# Patient Record
Sex: MALE | Race: WHITE | NOT HISPANIC OR LATINO | Employment: OTHER | URBAN - METROPOLITAN AREA
[De-identification: names, ages, dates, MRNs, and addresses within clinical notes are randomized per-mention and may not be internally consistent; named-entity substitution may affect disease eponyms.]

---

## 2019-10-17 ENCOUNTER — HOSPITAL ENCOUNTER (OUTPATIENT)
Dept: RADIOLOGY | Facility: HOSPITAL | Age: 69
Discharge: HOME | End: 2019-10-17
Attending: ORTHOPAEDIC SURGERY
Payer: MEDICARE

## 2019-10-17 DIAGNOSIS — M25.561 BILATERAL KNEE PAIN: ICD-10-CM

## 2019-10-17 DIAGNOSIS — M25.562 BILATERAL KNEE PAIN: ICD-10-CM

## 2019-10-17 PROCEDURE — 73564 X-RAY EXAM KNEE 4 OR MORE: CPT | Mod: 50,TC

## 2020-08-26 ENCOUNTER — HOSPITAL ENCOUNTER (EMERGENCY)
Facility: HOSPITAL | Age: 70
Discharge: HOME | End: 2020-08-27
Attending: SURGERY
Payer: MEDICARE

## 2020-08-26 DIAGNOSIS — R53.83 FATIGUE: ICD-10-CM

## 2020-08-26 DIAGNOSIS — F10.10 ALCOHOL ABUSE: Primary | ICD-10-CM

## 2020-08-26 LAB
ALBUMIN SERPL BCP-MCNC: 3.5 G/DL (ref 3.5–5.2)
ALP SERPL-CCNC: 70 U/L (ref 55–135)
ALT SERPL W/O P-5'-P-CCNC: 26 U/L (ref 10–44)
AMPHET+METHAMPHET UR QL: NEGATIVE
ANION GAP SERPL CALC-SCNC: 15 MMOL/L (ref 8–16)
APAP SERPL-MCNC: <3 UG/ML (ref 10–20)
APTT BLDCRRT: 29.1 SEC (ref 21–32)
AST SERPL-CCNC: 24 U/L (ref 10–40)
BARBITURATES UR QL SCN>200 NG/ML: NEGATIVE
BASOPHILS # BLD AUTO: 0.04 K/UL (ref 0–0.2)
BASOPHILS NFR BLD: 0.5 % (ref 0–1.9)
BENZODIAZ UR QL SCN>200 NG/ML: NEGATIVE
BILIRUB SERPL-MCNC: 0.4 MG/DL (ref 0.1–1)
BILIRUB UR QL STRIP: NEGATIVE
BNP SERPL-MCNC: 48 PG/ML (ref 0–99)
BUN SERPL-MCNC: 12 MG/DL (ref 8–23)
BZE UR QL SCN: NEGATIVE
CALCIUM SERPL-MCNC: 9.1 MG/DL (ref 8.7–10.5)
CANNABINOIDS UR QL SCN: NEGATIVE
CHLORIDE SERPL-SCNC: 87 MMOL/L (ref 95–110)
CK MB SERPL-MCNC: 1.1 NG/ML (ref 0.1–6.5)
CK MB SERPL-RTO: 2.6 % (ref 0–5)
CK SERPL-CCNC: 42 U/L (ref 20–200)
CK SERPL-CCNC: 42 U/L (ref 20–200)
CLARITY UR: CLEAR
CO2 SERPL-SCNC: 28 MMOL/L (ref 23–29)
COLOR UR: YELLOW
CREAT SERPL-MCNC: 0.7 MG/DL (ref 0.5–1.4)
CREAT UR-MCNC: 52.1 MG/DL (ref 23–375)
D DIMER PPP IA.FEU-MCNC: 0.29 MG/L FEU
DIFFERENTIAL METHOD: ABNORMAL
EOSINOPHIL # BLD AUTO: 0.1 K/UL (ref 0–0.5)
EOSINOPHIL NFR BLD: 1.3 % (ref 0–8)
ERYTHROCYTE [DISTWIDTH] IN BLOOD BY AUTOMATED COUNT: 12.4 % (ref 11.5–14.5)
EST. GFR  (AFRICAN AMERICAN): >60 ML/MIN/1.73 M^2
EST. GFR  (NON AFRICAN AMERICAN): >60 ML/MIN/1.73 M^2
ETHANOL SERPL-MCNC: 249 MG/DL
GLUCOSE SERPL-MCNC: 125 MG/DL (ref 70–110)
GLUCOSE UR QL STRIP: NEGATIVE
HCT VFR BLD AUTO: 39.8 % (ref 40–54)
HGB BLD-MCNC: 14.1 G/DL (ref 14–18)
HGB UR QL STRIP: NEGATIVE
IMM GRANULOCYTES # BLD AUTO: 0.04 K/UL (ref 0–0.04)
IMM GRANULOCYTES NFR BLD AUTO: 0.5 % (ref 0–0.5)
INR PPP: 1 (ref 0.8–1.2)
KETONES UR QL STRIP: ABNORMAL
LEUKOCYTE ESTERASE UR QL STRIP: NEGATIVE
LYMPHOCYTES # BLD AUTO: 2.4 K/UL (ref 1–4.8)
LYMPHOCYTES NFR BLD: 29.3 % (ref 18–48)
MAGNESIUM SERPL-MCNC: 1.7 MG/DL (ref 1.6–2.6)
MCH RBC QN AUTO: 33.2 PG (ref 27–31)
MCHC RBC AUTO-ENTMCNC: 35.4 G/DL (ref 32–36)
MCV RBC AUTO: 94 FL (ref 82–98)
METHADONE UR QL SCN>300 NG/ML: NEGATIVE
MONOCYTES # BLD AUTO: 0.9 K/UL (ref 0.3–1)
MONOCYTES NFR BLD: 10.9 % (ref 4–15)
NEUTROPHILS # BLD AUTO: 4.8 K/UL (ref 1.8–7.7)
NEUTROPHILS NFR BLD: 57.5 % (ref 38–73)
NITRITE UR QL STRIP: NEGATIVE
NRBC BLD-RTO: 0 /100 WBC
OPIATES UR QL SCN: NEGATIVE
PCP UR QL SCN>25 NG/ML: NEGATIVE
PH UR STRIP: 6 [PH] (ref 5–8)
PHOSPHATE SERPL-MCNC: 3.5 MG/DL (ref 2.7–4.5)
PLATELET # BLD AUTO: 250 K/UL (ref 150–350)
PMV BLD AUTO: 8.3 FL (ref 9.2–12.9)
POTASSIUM SERPL-SCNC: 3.2 MMOL/L (ref 3.5–5.1)
PROT SERPL-MCNC: 7 G/DL (ref 6–8.4)
PROT UR QL STRIP: NEGATIVE
PROTHROMBIN TIME: 10.8 SEC (ref 9–12.5)
RBC # BLD AUTO: 4.25 M/UL (ref 4.6–6.2)
SALICYLATES SERPL-MCNC: <5 MG/DL (ref 15–30)
SODIUM SERPL-SCNC: 130 MMOL/L (ref 136–145)
SP GR UR STRIP: 1.01 (ref 1–1.03)
TOXICOLOGY INFORMATION: NORMAL
TROPONIN I SERPL DL<=0.01 NG/ML-MCNC: 0.01 NG/ML (ref 0–0.03)
TSH SERPL DL<=0.005 MIU/L-ACNC: 1.62 UIU/ML (ref 0.4–4)
URN SPEC COLLECT METH UR: ABNORMAL
UROBILINOGEN UR STRIP-ACNC: NEGATIVE EU/DL
WBC # BLD AUTO: 8.33 K/UL (ref 3.9–12.7)

## 2020-08-26 PROCEDURE — 82550 ASSAY OF CK (CPK): CPT

## 2020-08-26 PROCEDURE — 93010 EKG 12-LEAD: ICD-10-PCS | Mod: ,,, | Performed by: INTERNAL MEDICINE

## 2020-08-26 PROCEDURE — 63600175 PHARM REV CODE 636 W HCPCS: Performed by: SURGERY

## 2020-08-26 PROCEDURE — 83880 ASSAY OF NATRIURETIC PEPTIDE: CPT

## 2020-08-26 PROCEDURE — 81003 URINALYSIS AUTO W/O SCOPE: CPT | Mod: 59

## 2020-08-26 PROCEDURE — 85610 PROTHROMBIN TIME: CPT

## 2020-08-26 PROCEDURE — 85379 FIBRIN DEGRADATION QUANT: CPT

## 2020-08-26 PROCEDURE — 82553 CREATINE MB FRACTION: CPT

## 2020-08-26 PROCEDURE — 80053 COMPREHEN METABOLIC PANEL: CPT

## 2020-08-26 PROCEDURE — 85025 COMPLETE CBC W/AUTO DIFF WBC: CPT

## 2020-08-26 PROCEDURE — 80320 DRUG SCREEN QUANTALCOHOLS: CPT

## 2020-08-26 PROCEDURE — 83735 ASSAY OF MAGNESIUM: CPT

## 2020-08-26 PROCEDURE — 96365 THER/PROPH/DIAG IV INF INIT: CPT

## 2020-08-26 PROCEDURE — 84443 ASSAY THYROID STIM HORMONE: CPT

## 2020-08-26 PROCEDURE — 96368 THER/DIAG CONCURRENT INF: CPT

## 2020-08-26 PROCEDURE — 82306 VITAMIN D 25 HYDROXY: CPT

## 2020-08-26 PROCEDURE — 80307 DRUG TEST PRSMV CHEM ANLYZR: CPT

## 2020-08-26 PROCEDURE — 85730 THROMBOPLASTIN TIME PARTIAL: CPT

## 2020-08-26 PROCEDURE — 80329 ANALGESICS NON-OPIOID 1 OR 2: CPT

## 2020-08-26 PROCEDURE — 93010 ELECTROCARDIOGRAM REPORT: CPT | Mod: ,,, | Performed by: INTERNAL MEDICINE

## 2020-08-26 PROCEDURE — 82746 ASSAY OF FOLIC ACID SERUM: CPT

## 2020-08-26 PROCEDURE — 93005 ELECTROCARDIOGRAM TRACING: CPT

## 2020-08-26 PROCEDURE — 96361 HYDRATE IV INFUSION ADD-ON: CPT

## 2020-08-26 PROCEDURE — 36415 COLL VENOUS BLD VENIPUNCTURE: CPT

## 2020-08-26 PROCEDURE — 25000003 PHARM REV CODE 250: Performed by: SURGERY

## 2020-08-26 PROCEDURE — 82607 VITAMIN B-12: CPT

## 2020-08-26 PROCEDURE — 84484 ASSAY OF TROPONIN QUANT: CPT

## 2020-08-26 PROCEDURE — 99285 EMERGENCY DEPT VISIT HI MDM: CPT | Mod: 25

## 2020-08-26 PROCEDURE — 96366 THER/PROPH/DIAG IV INF ADDON: CPT

## 2020-08-26 PROCEDURE — 84100 ASSAY OF PHOSPHORUS: CPT

## 2020-08-26 RX ORDER — VALSARTAN AND HYDROCHLOROTHIAZIDE 160; 25 MG/1; MG/1
1 TABLET ORAL DAILY
COMMUNITY

## 2020-08-26 RX ORDER — SODIUM CHLORIDE 9 MG/ML
1000 INJECTION, SOLUTION INTRAVENOUS
Status: COMPLETED | OUTPATIENT
Start: 2020-08-26 | End: 2020-08-27

## 2020-08-26 RX ORDER — AMLODIPINE BESYLATE 10 MG/1
10 TABLET ORAL DAILY
COMMUNITY

## 2020-08-26 RX ADMIN — ASCORBIC ACID, VITAMIN A PALMITATE, CHOLECALCIFEROL, THIAMINE HYDROCHLORIDE, RIBOFLAVIN-5 PHOSPHATE SODIUM, PYRIDOXINE HYDROCHLORIDE, NIACINAMIDE, DEXPANTHENOL, ALPHA-TOCOPHEROL ACETATE, VITAMIN K1, FOLIC ACID, BIOTIN, CYANOCOBALAMIN: 200; 3300; 200; 6; 3.6; 6; 40; 15; 10; 150; 600; 60; 5 INJECTION, SOLUTION INTRAVENOUS at 07:08

## 2020-08-26 RX ADMIN — THIAMINE HYDROCHLORIDE 100 MG: 100 INJECTION, SOLUTION INTRAMUSCULAR; INTRAVENOUS at 07:08

## 2020-08-26 RX ADMIN — FOLIC ACID 1 MG: 5 INJECTION, SOLUTION INTRAMUSCULAR; INTRAVENOUS; SUBCUTANEOUS at 07:08

## 2020-08-26 RX ADMIN — SODIUM CHLORIDE 1000 ML: 0.9 INJECTION, SOLUTION INTRAVENOUS at 09:08

## 2020-08-26 NOTE — ED TRIAGE NOTES
"69 y.o. male presents to ER ED 01/ED 01A   Chief Complaint   Patient presents with    Fall     brought via [facility] states pt fell off chair; reports pt has been drinking etoh. 18 g L H per [facility].    Pt states he accidentally fell of his chair; pt reports he just "could not get up". Denies LOC. Denies hitting head. No acute distress noted.    "

## 2020-08-27 ENCOUNTER — TELEPHONE (OUTPATIENT)
Dept: EMERGENCY MEDICINE | Facility: HOSPITAL | Age: 70
End: 2020-08-27

## 2020-08-27 VITALS
OXYGEN SATURATION: 96 % | DIASTOLIC BLOOD PRESSURE: 70 MMHG | RESPIRATION RATE: 20 BRPM | TEMPERATURE: 99 F | HEART RATE: 98 BPM | SYSTOLIC BLOOD PRESSURE: 164 MMHG

## 2020-08-27 LAB
25(OH)D3+25(OH)D2 SERPL-MCNC: 41 NG/ML (ref 30–96)
CK MB SERPL-MCNC: 1.9 NG/ML (ref 0.1–6.5)
CK MB SERPL-RTO: 3.3 % (ref 0–5)
CK SERPL-CCNC: 57 U/L (ref 20–200)
CK SERPL-CCNC: 57 U/L (ref 20–200)
ETHANOL SERPL-MCNC: 104 MG/DL
ETHANOL SERPL-MCNC: <10 MG/DL
FOLATE SERPL-MCNC: 19.1 NG/ML (ref 4–24)
TROPONIN I SERPL DL<=0.01 NG/ML-MCNC: <0.006 NG/ML (ref 0–0.03)
VIT B12 SERPL-MCNC: >2000 PG/ML (ref 210–950)

## 2020-08-27 PROCEDURE — 82550 ASSAY OF CK (CPK): CPT

## 2020-08-27 PROCEDURE — 36415 COLL VENOUS BLD VENIPUNCTURE: CPT

## 2020-08-27 PROCEDURE — 82553 CREATINE MB FRACTION: CPT

## 2020-08-27 PROCEDURE — 84484 ASSAY OF TROPONIN QUANT: CPT

## 2020-08-27 PROCEDURE — 80320 DRUG SCREEN QUANTALCOHOLS: CPT

## 2020-08-27 RX ORDER — LOPERAMIDE HCL/SIMETHICONE 2-125MG
1 TABLET ORAL DAILY
Qty: 30 TABLET | Refills: 11 | COMMUNITY
Start: 2020-08-27

## 2020-08-27 NOTE — ED NOTES
Pt requesting CM disconnected so pt can rest. MD states OK to discontinue cardiac monitor, NIBP, pulse ox and monitor pt. Comfort measures optimized for pt. IVF infusing with no issues; drsg D/I with no redness or swelling. Will monitor.

## 2020-08-27 NOTE — ED NOTES
Patient provided D/C instructions at the bedside.  Patient was provided the opportunity to review D/C summary and diagnosis.  Patient has no further questions or concerns in regards to treatment/care they have received today in the emergency department.  Patient acknowledged discharge teachings and follow-up appointment as directed.  Patient assisted to personal vehicle via wheelchair per ED tech while exiting the emergency department. Patient left with wife & son, who is driving the patient home.

## 2020-08-27 NOTE — ED PROVIDER NOTES
[facility]                                                 Chief Complaint  69 y.o. male with Fall    History of Present Illness  Alexandre Schwab presents to the emergency room after fall at home intoxicated  Patient states he had been drinking all day with out of his chair this afternoon  Patient obviously smelled heavily of alcohol, actually has no complaint on triage  Patient denies any head trauma or loss of consciousness with a slip and fall PTA  Patient has no obvious injuries with negative review of systems on initial triage    The history is provided by the patient   device was not used during this ER visit  Medical history: Hypertension  History reviewed. No pertinent surgical history.   No Known Allergies     I have reviewed all of this patient's past medical, surgical, family, and social   histories as well as active allergies and medications documented in the  electronic medical record    Review of Systems and Physical Exam      Review of Systems  -- Constitution - no fever, denies fatigue, no weakness, no chills  -- Eyes - no tearing or redness, no visual disturbance  -- Ear, Nose - no tinnitus or earache, no nasal congestion or discharge  -- Mouth,Throat - no sore throat, no toothache, normal voice, normal swallowing  -- Respiratory - denies cough and congestion, no shortness of breath, no DAVILA  -- Cardiovascular - denies chest pain, no palpitations, denies claudication  -- Gastrointestinal - denies abdominal pain, nausea, vomiting, or diarrhea  -- Genitourinary - no dysuria, denies flank pain, no hematuria, no STD risk  -- Musculoskeletal - denies back pain, negative for trauma or injury  -- Neurological - no headache, denies weakness or seizure; no LOC  -- Skin - denies pallor, rash, or changes in skin. no hives or welts noted    Vital Signs  His blood pressure is 164/70 and his pulse is 98.   His respiration is 20 and oxygen saturation is 96%.     Physical  Exam  -- Nursing note and vitals reviewed  -- Constitutional: obese white male obviously intoxicated  -- Head: Atraumatic. Normocephalic. No obvious abnormality  -- Eyes: Pupils are equal and reactive to light. Normal conjunctiva and lids  -- Cardiac: Normal rate, regular rhythm and normal heart sounds  -- Pulmonary: Normal respiratory effort, breath sounds clear to auscultation  -- Abdominal: Soft, no tenderness. Normal bowel sounds. Normal liver edge  -- Musculoskeletal: Normal range of motion, no effusions. Joints stable   -- Neurological: No focal deficits. Showed good interaction with staff  -- Vascular: Posterior tibial, dorsalis pedis and radial pulses 2+ bilaterally      Emergency Room Course      Lab Results   (L)   K 3.2 (L)   CL 87 (L)   CO2 28   BUN 12   CREATININE 0.7    (H)   ALKPHOS 70   AST 24   ALT 26   BILITOT 0.4   ALBUMIN 3.5   PROT 7.0   WBC 8.33   HGB 14.1   HCT 39.8 (L)      CPK 57   CPK 57   CPKMB 1.9   TROPONINI <0.006   INR 1.0   BNP 48   DDIMER 0.29   MG 1.7   TSH 1.617     Urinalysis  -- Urinalysis performed during this ER visit showed no signs of infection      EKG  -- The EKG findings today were without concerning findings from baseline     Radiology  -- The CT of the head performed in the ER today was negative for acute pathology  -- Chest x-ray showed no infiltrate and showed no acute pathology     Additional Work up  -- The tylenol level drawn today was within normal limits  -- The salicylate level drawn today was within normal limits  -- The TSH level drawn today was within normal limits  -- The ethanol level drawn today in the ER was zero    Medications Given  -- banana bag given the emergency room today  -- normal saline administered afterwards at 125 cc/hr    ED Physician Management  -- Diagnosis management comments: 69 y.o. male with alcohol intoxication  -- patient fell out of chair and felt like he could not get up after described fall  -- patient denies  any head trauma with a negative head CT in the ER today  -- patient had extensive negative metabolic workup except alcohol of 249  -- patient is not appear to be encephalopathic, normal liver function noted  -- answers all questions appropriately, he is very morbidly obese on exam  -- patient was given med and bag in the ER and IV fluids until ETOH 0  -- patient did not going to DTs the patient states he feels much better now  -- he refused all imitation for inpatient rehab for alcoholism on discharge   -- patient states he is completely asymptomatic, will seek outpatient help  -- the ER with any concerning symptoms after discharge today    Diagnosis  [R53.83] Fatigue  [F10.10] Alcohol abuse (Primary)    Disposition and Plan  -- Disposition: home  -- Condition: stable  -- Follow-up: Patient to follow up with Shaknar Soriano MD in 1-2 days.  -- I advised the patient that we have found no life threatening condition today  -- At this time, I believe the patient is clinically stable for discharge.   -- The patient acknowledges that close follow up with a MD is required   -- Patient agrees to comply with all instruction and direction given in the ER    This note is dictated on M*Modal word recognition program.  There are word recognition mistakes that are occasionally missed on review.         Pj Alex MD  08/27/20 [id number]

## 2022-09-21 ENCOUNTER — PATIENT OUTREACH (OUTPATIENT)
Dept: ADMINISTRATIVE | Facility: OTHER | Age: 72
End: 2022-09-21
Payer: MEDICARE

## 2022-09-21 VITALS — HEART RATE: 116 BPM | OXYGEN SATURATION: 95 % | SYSTOLIC BLOOD PRESSURE: 122 MMHG | DIASTOLIC BLOOD PRESSURE: 70 MMHG
